# Patient Record
Sex: MALE | Race: ASIAN | NOT HISPANIC OR LATINO | Employment: OTHER | URBAN - METROPOLITAN AREA
[De-identification: names, ages, dates, MRNs, and addresses within clinical notes are randomized per-mention and may not be internally consistent; named-entity substitution may affect disease eponyms.]

---

## 2024-01-04 ENCOUNTER — TELEPHONE (OUTPATIENT)
Dept: INTERNAL MEDICINE | Facility: CLINIC | Age: 64
End: 2024-01-04

## 2024-01-04 ENCOUNTER — OFFICE VISIT (OUTPATIENT)
Dept: INTERNAL MEDICINE | Facility: CLINIC | Age: 64
End: 2024-01-04
Payer: COMMERCIAL

## 2024-01-04 VITALS
WEIGHT: 189.63 LBS | TEMPERATURE: 97.3 F | BODY MASS INDEX: 25.13 KG/M2 | SYSTOLIC BLOOD PRESSURE: 120 MMHG | HEIGHT: 73 IN | DIASTOLIC BLOOD PRESSURE: 80 MMHG | HEART RATE: 75 BPM | OXYGEN SATURATION: 98 %

## 2024-01-04 DIAGNOSIS — H81.13 BENIGN PAROXYSMAL POSITIONAL VERTIGO DUE TO BILATERAL VESTIBULAR DISORDER: Primary | ICD-10-CM

## 2024-01-04 DIAGNOSIS — Z23 NEED FOR INFLUENZA VACCINATION: ICD-10-CM

## 2024-01-04 RX ORDER — MECLIZINE HCL 12.5 MG/1
12.5 TABLET ORAL 3 TIMES DAILY PRN
Qty: 90 TABLET | Refills: 1 | Status: SHIPPED | OUTPATIENT
Start: 2024-01-04

## 2024-01-04 RX ORDER — MECLIZINE HYDROCHLORIDE 25 MG/1
12.5 TABLET ORAL 3 TIMES DAILY PRN
Status: DISCONTINUED | OUTPATIENT
Start: 2024-01-04 | End: 2024-01-04

## 2024-01-04 NOTE — PROGRESS NOTES
"Chief Complaint  Diabetes (Type 2)    Subjective        Wendy Richards presents to [facility]  History of Present Illness      #Dizziness  Noticed 10 days ago, has been having the sensation of the room around him spinning, does not feel unsteady.  No substantial or significant falls.  Has been monitoring his diabetes at home, blood sugars been above goal but stable for the last month or so.  Intending to return to [place] to receive the remainder of his treatment.    #Type 2 diabetes  On medication, unable to determine what this medication is as comes from [place], not clear as patient only has tradename.  Will attempt to identify however in discussion patient reluctant to have medication filled here in the [place], this point I think it is reasonable to have patient continue follow-up with his primary care physician in [place].  Objective   Vital Signs:  /80   Pulse 75   Temp 97.3 °F (36.3 °C) (Temporal)   Ht 185.4 cm (73\")   Wt 86 kg (189 lb 10.1 oz)   SpO2 98%   BMI 25.02 kg/m²   Estimated body mass index is 25.02 kg/m² as calculated from the following:    Height as of this encounter: 185.4 cm (73\").    Weight as of this encounter: 86 kg (189 lb 10.1 oz).             Physical Exam  Vitals reviewed.   Constitutional:       General: He is not in acute distress.     Appearance: Normal appearance.   HENT:      Head: Normocephalic and atraumatic.   Eyes:      General: No scleral icterus.     Extraocular Movements: Extraocular movements intact.      Pupils: Pupils are equal, round, and reactive to light.   Cardiovascular:      Rate and Rhythm: Normal rate and regular rhythm.      Heart sounds: No murmur heard.     No friction rub. No gallop.   Pulmonary:      Effort: Pulmonary effort is normal.      Breath sounds: Normal breath sounds. No wheezing, rhonchi or rales.   Abdominal:      General: Abdomen is flat. Bowel sounds are normal. There is no distension.      " Palpations: Abdomen is soft.      Tenderness: There is no abdominal tenderness. There is no guarding.   Musculoskeletal:         General: Normal range of motion.      Right lower leg: No edema.      Left lower leg: No edema.   Skin:     General: Skin is warm and dry.      Capillary Refill: Capillary refill takes less than 2 seconds.      Coloration: Skin is not jaundiced.      Findings: No rash.   Neurological:      General: No focal deficit present.      Mental Status: He is alert and oriented to person, place, and time.      Comments: No nystagmus, substantial dizziness with positional changing, positive Edilson-Hallpike   Psychiatric:         Mood and Affect: Mood normal.         Behavior: Behavior normal.        Result Review :                   Assessment and Plan   Diagnoses and all orders for this visit:    1. Benign paroxysmal positional vertigo due to bilateral vestibular disorder (Primary)  -     meclizine (ANTIVERT) tablet 12.5 mg    2. Need for influenza vaccination  -     Fluzone (or Fluarix & Flulaval for VFC) >6mos    Believe patient symptoms are consistent with BPPV, will provide meclizine, information about Coden-Hallpike and otolith repositioning maneuvers.  Patient would like to see remainder of his care upon return to [facility] I think that is appropriate, patient okay to contact me while here in [place].         Follow Up   No follow-ups on file.  Patient was given instructions and counseling regarding his condition or for health maintenance advice. Please see specific information pulled into the AVS if appropriate.

## 2024-01-04 NOTE — TELEPHONE ENCOUNTER
Caller: JENIFER BARTHOLOMEW    Relationship: Child    Best call back number: [phone]    Requested Prescriptions:       meclizine (ANTIVERT) tablet 12.5 mg     Pharmacy where request should be sent: [facility]/PHARMACY #[id number] [place], [place] - [address] AT [facility] [phone] [facility] [phone] FX     Last office visit with prescribing clinician: 1/4/2024   Last telemedicine visit with prescribing clinician: Visit date not found   Next office visit with prescribing clinician: Visit date not found     Additional details provided by patient: PATIENTS DAUGHTER STATES THE [facility] HAS NOT RECEIVED THE PRESCRIPTION FOR THIS MEDICATION AND IS REQUESTING THE PRESCRIPTION TO BE SENT AS SOON AS POSSIBLE.     Does the patient have less than a 3 day supply:  [x] Yes  [] No    Would you like a call back once the refill request has been completed: [x] Yes [] No    If the office needs to give you a call back, can they leave a voicemail: [x] Yes [] No    Meli Chaudhari, FABIAN   01/04/24 15:19 EST